# Patient Record
Sex: MALE | Race: WHITE | NOT HISPANIC OR LATINO | URBAN - METROPOLITAN AREA
[De-identification: names, ages, dates, MRNs, and addresses within clinical notes are randomized per-mention and may not be internally consistent; named-entity substitution may affect disease eponyms.]

---

## 2020-07-23 ENCOUNTER — IMPORTED ENCOUNTER (OUTPATIENT)
Dept: URBAN - METROPOLITAN AREA CLINIC 50 | Facility: CLINIC | Age: 55
End: 2020-07-23

## 2020-07-23 NOTE — PATIENT DISCUSSION
"""Possiblibly side effect of medication metoprolol. Will order head MRI with out contrast. Will follow ""

## 2020-07-24 ENCOUNTER — IMPORTED ENCOUNTER (OUTPATIENT)
Dept: URBAN - METROPOLITAN AREA CLINIC 50 | Facility: CLINIC | Age: 55
End: 2020-07-24

## 2020-07-29 ENCOUNTER — IMPORTED ENCOUNTER (OUTPATIENT)
Dept: URBAN - METROPOLITAN AREA CLINIC 50 | Facility: CLINIC | Age: 55
End: 2020-07-29

## 2020-08-05 ENCOUNTER — IMPORTED ENCOUNTER (OUTPATIENT)
Dept: URBAN - METROPOLITAN AREA CLINIC 50 | Facility: CLINIC | Age: 55
End: 2020-08-05

## 2021-04-18 ASSESSMENT — VISUAL ACUITY
OS_CC: 20/25-1
OD_CC: J1@ 16 IN
OS_CC: J1@ 16 IN
OS_CC: 20/25-
OD_CC: 20/25-1
OD_CC: 20/30+2

## 2021-04-18 ASSESSMENT — TONOMETRY
OD_IOP_MMHG: 16
OS_IOP_MMHG: 16